# Patient Record
Sex: MALE
[De-identification: names, ages, dates, MRNs, and addresses within clinical notes are randomized per-mention and may not be internally consistent; named-entity substitution may affect disease eponyms.]

---

## 2022-05-19 ENCOUNTER — APPOINTMENT (OUTPATIENT)
Dept: PULMONOLOGY | Facility: CLINIC | Age: 57
End: 2022-05-19
Payer: MEDICARE

## 2022-05-19 VITALS
DIASTOLIC BLOOD PRESSURE: 70 MMHG | TEMPERATURE: 97 F | OXYGEN SATURATION: 97 % | HEART RATE: 109 BPM | BODY MASS INDEX: 27.44 KG/M2 | WEIGHT: 196 LBS | HEIGHT: 71 IN | SYSTOLIC BLOOD PRESSURE: 116 MMHG

## 2022-05-19 DIAGNOSIS — L72.3 SEBACEOUS CYST: ICD-10-CM

## 2022-05-19 DIAGNOSIS — Z80.3 FAMILY HISTORY OF MALIGNANT NEOPLASM OF BREAST: ICD-10-CM

## 2022-05-19 DIAGNOSIS — Z87.891 PERSONAL HISTORY OF NICOTINE DEPENDENCE: ICD-10-CM

## 2022-05-19 DIAGNOSIS — M25.519 PAIN IN UNSPECIFIED SHOULDER: ICD-10-CM

## 2022-05-19 DIAGNOSIS — Z86.59 PERSONAL HISTORY OF OTHER MENTAL AND BEHAVIORAL DISORDERS: ICD-10-CM

## 2022-05-19 DIAGNOSIS — Z86.16 PERSONAL HISTORY OF COVID-19: ICD-10-CM

## 2022-05-19 PROBLEM — Z00.00 ENCOUNTER FOR PREVENTIVE HEALTH EXAMINATION: Status: ACTIVE | Noted: 2022-05-19

## 2022-05-19 PROCEDURE — 99203 OFFICE O/P NEW LOW 30 MIN: CPT

## 2022-05-19 NOTE — REASON FOR VISIT
[Initial] : an initial visit [Shortness of Breath] : shortness of breath [TextBox_44] : post-COVID [TextBox_13] : Dr. Fernandez

## 2022-05-19 NOTE — HISTORY OF PRESENT ILLNESS
[Former] : former [>= 20 pack years] : >= 20 pack years [Never] : never [Current] : current [TextBox_4] : 56M former smoker here to establish care. He was previously following with Dr. Fernandez for both primary care services & also routine Pulmonary follow-up given his prior smoking history. He had PFTs done in the past but could not tolerate albuterol as he had an allergic reaction. Additionally, he had COVID in 10/2021 while he was visiting  and had to quarantine there for 20 days. Symptoms primarily fatigue with shortness of breath. He is vaccinated and did not require hospitalization. He reports he has had some shortness of breath on/off since then. He also has had a decline in his functional status for the past 1 year due to R shoulder and R knee injuries requiring surgeries. He is a retired  and had sustained injuries while on the job\par \par +tobacco: 2ppd x35 yrs; qiuit in 2019\par +marijuana: 1-2 joint daily\par \par  [TextBox_11] : 2 [TextBox_13] : 35 [YearQuit] : 2019 [TextBox_27] : 1-2 joints daily

## 2022-05-19 NOTE — DISCUSSION/SUMMARY
[FreeTextEntry1] : 56M former smoker here to establish care\par \par #Former tobacco abuse\par - Qualifies for LDCT for lung ca screening; order placed today\par - Counseled on marijuana smoking cessation; discussed transitioning to ingestion\par \par #Shortness of breath\par - Likely multifactorial\par - Will check PFTs now to assess baseline pulmonary function. Will do BD challenge with ipratropium given prior allergic reaction to albuterol per Pt\par - Will need to obtain records from Dr. Fernandez's office\par \par Follow-up in1-2 months

## 2022-05-19 NOTE — REVIEW OF SYSTEMS
[Fatigue] : fatigue [Dyspnea] : dyspnea [Arthralgias] : arthralgias [Trauma/ Injury] : trauma/ injury [Chronic Pain] : chronic pain [Anxiety] : anxiety [Fever] : no fever [Chills] : no chills [Poor Appetite] : no poor appetite [Ear Disturbance] : no ear disturbance [Sinus Problems] : no sinus problems [Cough] : no cough [Sputum] : no sputum [Chest Discomfort] : no chest discomfort [Orthopnea] : no orthopnea [Syncope] : no syncope [Watery Eyes] : no watery eyes [Nasal Discharge] : no nasal discharge [GERD] : no gerd [Food Intolerance] : no food intolerance [Raynaud] : no raynaud [Rash] : no rash [Anemia] : no anemia [Blood Transfusion] : no blood transfusion [Depression] : no depression [Diabetes] : no diabetes [Thyroid Problem] : no thyroid problem

## 2022-05-19 NOTE — PHYSICAL EXAM
[No Acute Distress] : no acute distress [Normal Appearance] : normal appearance [No Neck Mass] : no neck mass [Normal Rate/Rhythm] : normal rate/rhythm [Normal S1, S2] : normal s1, s2 [No Murmurs] : no murmurs [No Resp Distress] : no resp distress [Clear to Auscultation Bilaterally] : clear to auscultation bilaterally [No Abnormalities] : no abnormalities [Normal Gait] : normal gait [No Clubbing] : no clubbing [No Cyanosis] : no cyanosis [No Edema] : no edema [Normal Color/ Pigmentation] : normal color/ pigmentation [No Focal Deficits] : no focal deficits

## 2022-07-21 ENCOUNTER — APPOINTMENT (OUTPATIENT)
Dept: PULMONOLOGY | Facility: CLINIC | Age: 57
End: 2022-07-21

## 2022-07-27 ENCOUNTER — APPOINTMENT (OUTPATIENT)
Dept: PULMONOLOGY | Facility: CLINIC | Age: 57
End: 2022-07-27

## 2022-07-27 VITALS
WEIGHT: 200 LBS | HEIGHT: 71 IN | HEART RATE: 76 BPM | SYSTOLIC BLOOD PRESSURE: 126 MMHG | BODY MASS INDEX: 28 KG/M2 | OXYGEN SATURATION: 97 % | DIASTOLIC BLOOD PRESSURE: 80 MMHG | TEMPERATURE: 98 F

## 2022-07-27 DIAGNOSIS — R06.02 SHORTNESS OF BREATH: ICD-10-CM

## 2022-07-27 DIAGNOSIS — F17.201 NICOTINE DEPENDENCE, UNSPECIFIED, IN REMISSION: ICD-10-CM

## 2022-07-27 PROCEDURE — 99213 OFFICE O/P EST LOW 20 MIN: CPT

## 2022-07-27 NOTE — COUNSELING
[Good understanding] : Patient has a good understanding of lifestyle changes and steps needed to achieve self management goal [de-identified] : Pt remains abstinent from tobacco but still smoking marijuana. Counseled on alternative ways of marijuana use, such as ingestion.

## 2022-07-27 NOTE — HISTORY OF PRESENT ILLNESS
[Former] : former [>= 20 pack years] : >= 20 pack years [Never] : never [Current] : current [Lung Cancer Screening] : Patient underwent lung cancer screening [0] : 0 [TextBox_4] : 56M former smoker here to establish care. He was previously following with Dr. Fernandez for both primary care services & also routine Pulmonary follow-up given his prior smoking history. He had PFTs done in the past but could not tolerate albuterol as he had an allergic reaction. Additionally, he had COVID in 10/2021 while he was visiting  and had to quarantine there for 20 days. Symptoms primarily fatigue with shortness of breath. He is vaccinated and did not require hospitalization. He reports he has had some shortness of breath on/off since then. He also has had a decline in his functional status for the past 1 year due to R shoulder and R knee injuries requiring surgeries. He is a retired  and had sustained injuries while on the job\par \par +tobacco: 2ppd x35 yrs; qiuit in 2019\par +marijuana: 1-2 joint daily\par \par 7/27/22\par Mr. Mosquera is here today for follow-up. Since last visit, PFT and LDCT ordered. Unable to get PFTs done but did have LDCT done 6/2022. He brings in CD of imaging today and report has been faxed to my office. In terms of symptoms, he remains asymptomatic. Does not use any inhalers. Denies chest pain, shortness of breath, cough, hemoptysis, n/v, poor appetite. [TextBox_11] : 2 [TextBox_13] : 35 [YearQuit] : 2019 [TextBox_27] : 1-2 joints daily [TextBox_12] : 06/2022

## 2022-07-27 NOTE — DISCUSSION/SUMMARY
[FreeTextEntry1] : 56M former smoker here for routine follow-up\par \par #Former tobacco abuse\par - CT chest imaging & report reviewed. No suspicious findings; repeat annually (due 6/2023). CT imaging also incidentally found gallstones. Advised to get RUQ sono.\par - Counseled again on marijuana smoking cessation; discussed transitioning to ingestion\par \par #Shortness of breath\par - resolved since last visit\par \par Follow-up in1 year